# Patient Record
Sex: MALE | Race: WHITE | Employment: FULL TIME | URBAN - METROPOLITAN AREA
[De-identification: names, ages, dates, MRNs, and addresses within clinical notes are randomized per-mention and may not be internally consistent; named-entity substitution may affect disease eponyms.]

---

## 2018-01-05 PROCEDURE — 87591 N.GONORRHOEAE DNA AMP PROB: CPT | Performed by: INTERNAL MEDICINE

## 2018-01-05 PROCEDURE — 87491 CHLMYD TRACH DNA AMP PROBE: CPT | Performed by: INTERNAL MEDICINE

## 2018-01-05 PROCEDURE — 87389 HIV-1 AG W/HIV-1&-2 AB AG IA: CPT | Performed by: INTERNAL MEDICINE

## 2018-01-05 PROCEDURE — 86780 TREPONEMA PALLIDUM: CPT | Performed by: INTERNAL MEDICINE

## 2018-01-05 PROCEDURE — 36415 COLL VENOUS BLD VENIPUNCTURE: CPT | Performed by: INTERNAL MEDICINE

## 2019-01-18 PROCEDURE — 87389 HIV-1 AG W/HIV-1&-2 AB AG IA: CPT | Performed by: INTERNAL MEDICINE

## 2019-01-18 PROCEDURE — 36415 COLL VENOUS BLD VENIPUNCTURE: CPT | Performed by: INTERNAL MEDICINE

## 2023-01-19 ENCOUNTER — HOSPITAL ENCOUNTER (OUTPATIENT)
Age: 38
Discharge: HOME | End: 2023-01-19
Attending: EMERGENCY MEDICINE
Payer: COMMERCIAL

## 2023-01-19 VITALS
DIASTOLIC BLOOD PRESSURE: 93 MMHG | TEMPERATURE: 98 F | RESPIRATION RATE: 20 BRPM | HEART RATE: 93 BPM | OXYGEN SATURATION: 97 % | SYSTOLIC BLOOD PRESSURE: 169 MMHG

## 2023-01-19 DIAGNOSIS — J02.9 VIRAL PHARYNGITIS: Primary | ICD-10-CM

## 2023-01-19 LAB — S PYO AG THROAT QL: NEGATIVE

## 2023-01-19 PROCEDURE — 87880 STREP A ASSAY W/OPTIC: CPT

## 2023-01-19 PROCEDURE — 99203 OFFICE O/P NEW LOW 30 MIN: CPT

## 2023-01-19 PROCEDURE — 99202 OFFICE O/P NEW SF 15 MIN: CPT

## 2023-01-19 NOTE — ED INITIAL ASSESSMENT (HPI)
Patient with sore throat starting Sunday night into Monday. + painful swallow.   States symptoms worsen at bedtime.  - at home covid test.

## 2024-01-15 ENCOUNTER — HOSPITAL ENCOUNTER (OUTPATIENT)
Age: 39
Discharge: HOME | End: 2024-01-15
Payer: COMMERCIAL

## 2024-01-15 VITALS
DIASTOLIC BLOOD PRESSURE: 77 MMHG | SYSTOLIC BLOOD PRESSURE: 127 MMHG | TEMPERATURE: 97 F | RESPIRATION RATE: 18 BRPM | HEART RATE: 95 BPM | OXYGEN SATURATION: 98 %

## 2024-01-15 DIAGNOSIS — L72.0 EPIDERMOID CYST OF SKIN OF SCALP: Primary | ICD-10-CM

## 2024-01-15 PROCEDURE — 10060 I&D ABSCESS SIMPLE/SINGLE: CPT

## 2024-01-15 PROCEDURE — 99213 OFFICE O/P EST LOW 20 MIN: CPT

## 2024-01-15 RX ORDER — LIDOCAINE HYDROCHLORIDE AND EPINEPHRINE 10; 10 MG/ML; UG/ML
10 INJECTION, SOLUTION INFILTRATION; PERINEURAL ONCE
Status: COMPLETED | OUTPATIENT
Start: 2024-01-15 | End: 2024-01-15

## 2024-01-15 RX ORDER — DOXYCYCLINE HYCLATE 100 MG/1
100 CAPSULE ORAL 2 TIMES DAILY
Qty: 14 CAPSULE | Refills: 0 | Status: SHIPPED | OUTPATIENT
Start: 2024-01-15 | End: 2024-01-22

## 2024-01-15 RX ORDER — DOXYCYCLINE HYCLATE 100 MG/1
100 CAPSULE ORAL 2 TIMES DAILY
Qty: 14 CAPSULE | Refills: 0 | Status: SHIPPED | OUTPATIENT
Start: 2024-01-15 | End: 2024-01-15

## 2024-01-15 NOTE — ED PROVIDER NOTES
Patient Seen in: [facility] [place]      History     Chief Complaint   Patient presents with    Cyst     Stated Complaint: Head Cyst    Subjective:   38-year-old male with no known medical problems presents from home.  Patient states he gets recurrent cysts on his scalp.  He has had 1 to the back of his head for about 10 years.  States over the last 3 days symptoms have increased with tenderness and drainage.  States it became very large in size and started draining on its own.  States the drainage smells bad.  No fever.  No history of diabetes.  He has been following with his primary doctor and was referred to dermatology, he does not have an appointment yet.  States he has never had to have one of the cyst drained but has had them surgically removed in the past    The history is provided by the patient. No  was used.         Objective:   No pertinent past medical history.          HISTORY:  No past medical history on file.   Past Surgical History:   Procedure Laterality Date    EXC ABD LES SC < 3 CM N/A 8/1/2016    Procedure: EXPLORATION OF UMBILICAL ABSCESS;  Surgeon: Rosendo Gonzales MD;  Location: [facility]    EXC SKIN BENIG 0.6-1CM REMAINDR BODY N/A 8/1/2016    Procedure: EXCISION SCALP CYST/LESION;  Surgeon: Rosendo Gonzales MD;  Location: [facility]    EXC SKIN BENIG 1.1-2CM REMAINDR BODY N/A 8/1/2016    Procedure: EXCISION SCALP CYST/LESION;  Surgeon: Rosendo Gonzales MD;  Location: [facility]    EXC SKIN BENIG 1.1-2CM REMAINDR BODY N/A 8/1/2016    Procedure: EXCISION SCALP CYST/LESION;  Surgeon: Rosendo Gonzales MD;  Location: [facility]    EXC SKIN BENIG 1.1-2CM REMAINDR BODY N/A 8/1/2016    Procedure: EXCISION SCALP CYST/LESION;  Surgeon: Rosendo Gonzales MD;  Location: [facility]    TONSILLECTOMY        Family History   Problem Relation Age of Onset    Thyroid disease Mother     Breast Cancer Father      Heart Disease Father     Diabetes Paternal Grandfather     Diabetes Paternal Grandmother       Social History     Socioeconomic History    Marital status:     Number of children: 0   Occupational History    Occupation: Purchasing    Tobacco Use    Smoking status: Former    Smokeless tobacco: Never   Vaping Use    Vaping Use: Some days   Substance and Sexual Activity    Alcohol use: Yes     Alcohol/week: 2.0 standard drinks of alcohol     Types: 2 Cans of beer per week    Drug use: No          No pertinent past surgical history.              No pertinent social history.            Review of Systems    Positive for stated complaint: Head Cyst  Other systems are as noted in HPI.  Constitutional and vital signs reviewed.      All other systems reviewed and negative except as noted above.    Physical Exam     ED Triage Vitals [01/15/24 0840]   /77   Pulse 95   Resp 18   Temp 97.2 °F (36.2 °C)   Temp src Temporal   SpO2 98 %   O2 Device None (Room air)       Current:/77   Pulse 95   Temp 97.2 °F (36.2 °C) (Temporal)   Resp 18   SpO2 98%         Physical Exam  Vitals and nursing note reviewed.   Constitutional:       General: He is not in acute distress.     Appearance: Normal appearance. He is not ill-appearing or toxic-appearing.   HENT:      Head: Normocephalic and atraumatic.      Nose: Nose normal.      Mouth/Throat:      Mouth: Mucous membranes are moist.      Pharynx: Oropharynx is clear.   Eyes:      Pupils: Pupils are equal, round, and reactive to light.   Cardiovascular:      Rate and Rhythm: Normal rate and regular rhythm.      Pulses: Normal pulses.   Pulmonary:      Effort: Pulmonary effort is normal. No respiratory distress.      Breath sounds: Normal breath sounds.   Abdominal:      General: Abdomen is flat.      Palpations: Abdomen is soft.   Musculoskeletal:         General: No signs of injury. Normal range of motion.      Cervical back: Normal range of motion and neck supple.    Skin:     General: Skin is warm and dry.      Capillary Refill: Capillary refill takes less than 2 seconds.      Comments: 1.75 cm round cyst/abscess to right occiput. Tender. Fluctuant.  No spontaneous drainage on exam.  No erythema.   Neurological:      General: No focal deficit present.      Mental Status: He is alert and oriented to person, place, and time.      GCS: GCS eye subscore is 4. GCS verbal subscore is 5. GCS motor subscore is 6.   Psychiatric:         Mood and Affect: Mood normal.         Behavior: Behavior normal.         Thought Content: Thought content normal.         Judgment: Judgment normal.         ED Course   Labs Reviewed - No data to display  Procedure Note:  Risks and benefits of procedure discussed.   There is an area characterized by a soft mobile subQ mass measuring 2 cm in greatest dimension. Location: scalp.   Area prepped and draped.  1%lidocaine with epi 5 cc 3 ml injected circumferentially.  0.5cm incision to abscess site made with #11 blade, all loculations broken.  Thick, curdled white drainage with blood from cyst  Wound irrigated copiously.  Packed with antibiotic ointment  Dressing applied.  Pt tolerated procedure well.      MDM      Medical Decision Making  Epidermoid cyst  Chronic epidermoid cyst for 10 years, now with spontaneous drainage, increase in size, tenderness  Cyst is fluctuant.  No spontaneous drainage here but does have a small scab from previous drainage site.  No significant erythema or cellulitis  No fever.  Normal vital signs.  No sepsis.  Symptoms chronic, recurrent, persistent.  Consistent with folliculitis, although he does not have any in his facial hair.  Incision and drainage performed  Plan of care: Doxycycline, warm moist compresses, topical antibiotics.  Follow-up with dermatology as previously instructed  Results and plan of care discussed with the patient/family. They are in agreement with discharge. They understand to follow up with their primary  doctor or the referral physician for further evaluation, especially if no improvement.  Also discussed the limitations of immediate care, patient is aware that if symptoms are worse they should go to the emergency room. Verbal and written discharge instructions were given.       Problems Addressed:  Epidermoid cyst of skin of scalp: acute illness or injury    Risk  OTC drugs.  Prescription drug management.        Disposition and Plan     Clinical Impression:  1. Epidermoid cyst of skin of scalp         Disposition:  Discharge  1/15/2024  9:04 am    Follow-up:  Doug Palma MD  [facility] [place] [address]  [phone]                Medications Prescribed:  Discharge Medication List as of 1/15/2024  9:09 AM

## 2024-01-15 NOTE — DISCHARGE INSTRUCTIONS
Clean the area with soap and water.  Apply warm moist compresses.  Apply topical antibiotic ointment twice a day.  Take doxycycline twice a day until gone.  Change the dressing as needed.  Follow-up with dermatology.